# Patient Record
Sex: MALE | Race: WHITE
[De-identification: names, ages, dates, MRNs, and addresses within clinical notes are randomized per-mention and may not be internally consistent; named-entity substitution may affect disease eponyms.]

---

## 2019-10-07 ENCOUNTER — HOSPITAL ENCOUNTER (EMERGENCY)
Dept: HOSPITAL 41 - JD.ED | Age: 79
Discharge: HOME | End: 2019-10-07
Payer: MEDICARE

## 2019-10-07 DIAGNOSIS — Z79.82: ICD-10-CM

## 2019-10-07 DIAGNOSIS — Z79.899: ICD-10-CM

## 2019-10-07 DIAGNOSIS — I48.91: ICD-10-CM

## 2019-10-07 DIAGNOSIS — E78.00: ICD-10-CM

## 2019-10-07 DIAGNOSIS — J40: Primary | ICD-10-CM

## 2019-10-07 DIAGNOSIS — Z88.8: ICD-10-CM

## 2019-10-07 DIAGNOSIS — Z91.048: ICD-10-CM

## 2019-10-07 DIAGNOSIS — Z79.01: ICD-10-CM

## 2019-10-07 PROCEDURE — 86140 C-REACTIVE PROTEIN: CPT

## 2019-10-07 PROCEDURE — 71045 X-RAY EXAM CHEST 1 VIEW: CPT

## 2019-10-07 PROCEDURE — 85025 COMPLETE CBC W/AUTO DIFF WBC: CPT

## 2019-10-07 PROCEDURE — 99283 EMERGENCY DEPT VISIT LOW MDM: CPT

## 2019-10-07 PROCEDURE — 36415 COLL VENOUS BLD VENIPUNCTURE: CPT

## 2019-10-07 NOTE — EDM.PDOC
ED HPI GENERAL MEDICAL PROBLEM





- General


Chief Complaint: Respiratory Problem


Stated Complaint: POSSIBLE PNEUMONIA


Time Seen by Provider: 10/07/19 16:07


Source of Information: Reports: Patient, RN Notes Reviewed





- History of Present Illness


INITIAL COMMENTS - FREE TEXT/NARRATIVE: 





78 year old male comes in with continued cough that first started about 10 days 

ago.  He was prescribed a z pac about 10 days ago.  The cough continues about 

the same, occasionally productive of mildly colored phlegm.  He has had some 

chills.  No definite fever.  Mildly more short of breath than usual. He did 

have a sore throat initially but that is gone.  Mild chronic leg swelling. 





- Related Data


 Allergies











Allergy/AdvReac Type Severity Reaction Status Date / Time


 


adhesive Allergy  Rash Verified 10/07/19 16:11


 


Penicillins Allergy  Airway Verified 10/07/19 16:11





   Tightness  


 


povidone-iodine Allergy  Rash Verified 10/07/19 16:11





[From Betadine]     


 


soap [From Betadine] Allergy  Rash Verified 10/07/19 16:11











Home Meds: 


 Home Meds





Acetaminophen [Tylenol Arthritis Pain] 1 tab PO TID PRN 09/24/14 [History]


Aspirin [Low Dose Aspirin EC] 81 mg PO DAILY 09/24/14 [History]


Atenolol 1 tab PO DAILY 09/24/14 [History]


Fluticasone Propionate [Flonase] 1 spray NASBOTH DAILY PRN 09/24/14 [History]


Furosemide [Lasix] 1 tab PO BID 09/24/14 [History]


Gabapentin [Neurontin] 1 tab PO TID 09/24/14 [History]


Isosorbide Mononitrate [Isosorbide Mononitrate ER] 60 mg PO DAILY 09/24/14 [

History]


Lisinopril 10 mg PO DAILY 09/24/14 [History]


Multivitamin [Multivitamins] 1 cap PO DAILY 09/24/14 [History]


Nabumetone [Relafen] 500 mg PO BID 09/24/14 [History]


Nitroglycerin [Nitrostat] 0.4 mg SL ASDIRECTED PRN 09/24/14 [History]


Omeprazole [Prilosec] 20 mg PO DAILY 09/24/14 [History]


Simvastatin [Zocor] 20 mg PO BEDTIME 09/24/14 [History]


Tamsulosin HCl [Flomax] 1 cap PO DAILY 09/24/14 [History]


Warfarin [Coumadin] 2 mg PO DAILY 09/24/14 [History]


guaiFEN/Phenyleph/Acetaminophn [Mucinex Fast-Max Cold-Sinus Tb] 400 mg PO DAILY 

09/24/14 [History]


Doxycycline [Vibramycin] 100 mg PO BID #20 tab 10/07/19 [Rx]











Past Medical History


Cardiovascular History: Reports: Afib, High Cholesterol, Hypertension


Respiratory History: Reports: Bronchitis, Recurrent





- Past Surgical History


Cardiovascular Surgical History: Reports: Coronary Artery Stent


Neurological Surgical History: Reports: Lumbar Spine


Musculoskeletal Surgical History: Reports: Knee Replacement





Social & Family History





- Tobacco Use


Smoking Status *Q: Never Smoker





- Caffeine Use


Caffeine Use: Reports: None





- Recreational Drug Use


Recreational Drug Use: No





ED ROS GENERAL





- Review of Systems


Review Of Systems: See Below


Constitutional: Reports: Chills.  Denies: Fever (no definite fever)


HEENT: Reports: Rhinitis (chronic), Throat Pain (gone)


Respiratory: Reports: Shortness of Breath, Cough, Sputum


Cardiovascular: Reports: Dyspnea on Exertion.  Denies: Chest Pain


Endocrine: Reports: Fatigue


GI/Abdominal: Denies: Abdominal Pain, Nausea, Vomiting


Musculoskeletal: Denies: Shoulder Pain, Arm Pain


Skin: Reports: No Symptoms


Neurological: Reports: No Symptoms





ED EXAM, GENERAL





- Physical Exam


Exam: See Below


General Appearance: Alert, No Apparent Distress


Eye Exam: Bilateral Eye: PERRL


Throat/Mouth: Normal Inspection, Normal Oropharynx


Head: Atraumatic


Neck: Supple


Respiratory/Chest: No Respiratory Distress, Lungs Clear, Normal Breath Sounds.  

No: Rales, Rhonchi, Wheezing


Cardiovascular: Regular Rate, Rhythm


GI/Abdominal: Non-Tender


Extremities: Pedal Edema (very mild bilat leg swelling).  No: Leg Pain, 

Increased Warmth, Redness





Course





- Vital Signs


Last Recorded V/S: 


 Last Vital Signs











Temp  97.9 F   10/07/19 16:09


 


Pulse  87   10/07/19 16:09


 


Resp  18   10/07/19 16:09


 


BP  141/74 H  10/07/19 16:09


 


Pulse Ox  94 L  10/07/19 16:09














- Orders/Labs/Meds


Orders: 


 Active Orders 24 hr











 Category Date Time Status


 


 Influenza Vaccine Charge [RC] .DISCHARGE Care  10/07/19 16:17 Active


 


 Chest 1V Frontal [CR] Stat Exams  10/07/19 16:30 Taken











Labs: 


 Laboratory Tests











  10/07/19 10/07/19 Range/Units





  16:44 16:44 


 


WBC   7.59  (4.23-9.07)  K/mm3


 


RBC   4.34 L  (4.63-6.08)  M/mm3


 


Hgb   13.8  (13.7-17.5)  gm/dl


 


Hct   39.6 L  (40.1-51.0)  %


 


MCV   91.2  (79.0-92.2)  fl


 


MCH   31.8  (25.7-32.2)  pg


 


MCHC   34.8  (32.2-35.5)  g/dl


 


RDW Std Deviation   44.9 H  (35.1-43.9)  fL


 


Plt Count   183  (163-337)  K/mm3


 


MPV   11.4  (9.4-12.3)  fl


 


Neut % (Auto)   61.9  (34.0-67.9)  %


 


Lymph % (Auto)   25.8  (21.8-53.1)  %


 


Mono % (Auto)   9.4  (5.3-12.2)  %


 


Eos % (Auto)   2.6  (0.8-7.0)  


 


Baso % (Auto)   0.3  (0.1-1.2)  %


 


Neut # (Auto)   4.70  (1.78-5.38)  K/mm3


 


Lymph # (Auto)   1.96  (1.32-3.57)  K/mm3


 


Mono # (Auto)   0.71  (0.30-0.82)  K/mm3


 


Eos # (Auto)   0.20  (0.04-0.54)  K/mm3


 


Baso # (Auto)   0.02  (0.01-0.08)  K/mm3


 


Manual Slide Review     


 


C-Reactive Protein  < 0.2   (<1.0)  mg/dL











Meds: 


Medications














Discontinued Medications














Generic Name Dose Route Start Last Admin





  Trade Name Freq  PRN Reason Stop Dose Admin


 


Doxycycline Hyclate  100 mg  10/07/19 17:51  





  Vibramycin  PO  10/07/19 17:52  





  ONETIME ONE   





     





     





     





     


 


Influenza Virus Vaccine  1 each  10/07/19 16:17  





  Pharmacy To Dose - Influenza Vaccine  [facility]  10/07/19 16:18  





  ONETIME ONE   





     





     





     





     


 


Influenza Virus Vaccine  180 mcg  10/07/19 16:30  





  Fluzone High-Dose 2019-20 Syringe  [facility]  10/07/19 16:31  





  .ONCE ONE   





     





     





     





     














Departure





- Departure


Time of Disposition: 18:01


Disposition: Home, Self-Care 01


Condition: Fair


Clinical Impression: 


 Bronchitis








- Discharge Information


Forms:  ED Department Discharge


Additional Instructions: 


doxycycline 100 mg twice daily for 10 days. Your first dose has been given here 

in the ED.  Prescription has been sent electronic to [facility].   

Vaporizer or steam as needed.  Continue other medications as prescribed.  

Follow up with your regular medical provider or Pulmonologist in about 10 days 

for recheck.  Return to ED as needed if symptoms worsening in any way.  





- My Orders


Last 24 Hours: 


My Active Orders





10/07/19 16:17


Influenza Vaccine Charge [RC] .DISCHARGE 





10/07/19 16:30


Chest 1V Frontal [CR] Stat 














- Assessment/Plan


Last 24 Hours: 


My Active Orders





10/07/19 16:17


Influenza Vaccine Charge [RC] .DISCHARGE 





10/07/19 16:30


Chest 1V Frontal [CR] Stat

## 2019-10-07 NOTE — CR
Chest: Portable view of the chest was obtained.

 

Comparison: Prior chest x-ray of 12/06/10.

 

Slight increased density along the left cardiac margin is noted most 

likely due to an area of atelectasis.  No acute parenchymal change is 

otherwise seen.  Heart is felt to be mildly enlarged.  Upper 

mediastinum is normal.  Bony structures are grossly intact.

 

Impression:

1.  Findings as noted above.  Nothing acute is definitely appreciated.

 

Diagnostic code #2

## 2020-01-14 ENCOUNTER — HOSPITAL ENCOUNTER (EMERGENCY)
Dept: HOSPITAL 41 - JD.ED | Age: 80
Discharge: HOME | End: 2020-01-14
Payer: MEDICARE

## 2020-01-14 DIAGNOSIS — Z87.891: ICD-10-CM

## 2020-01-14 DIAGNOSIS — Z88.8: ICD-10-CM

## 2020-01-14 DIAGNOSIS — Z88.0: ICD-10-CM

## 2020-01-14 DIAGNOSIS — K21.9: ICD-10-CM

## 2020-01-14 DIAGNOSIS — E78.00: ICD-10-CM

## 2020-01-14 DIAGNOSIS — Z79.82: ICD-10-CM

## 2020-01-14 DIAGNOSIS — I48.91: ICD-10-CM

## 2020-01-14 DIAGNOSIS — R79.1: Primary | ICD-10-CM

## 2020-01-14 DIAGNOSIS — Z79.899: ICD-10-CM

## 2020-01-14 DIAGNOSIS — I25.2: ICD-10-CM

## 2020-01-14 DIAGNOSIS — Z91.048: ICD-10-CM

## 2020-01-14 DIAGNOSIS — M19.90: ICD-10-CM

## 2020-01-14 DIAGNOSIS — I10: ICD-10-CM

## 2020-01-14 DIAGNOSIS — N40.0: ICD-10-CM

## 2020-01-14 PROCEDURE — 85610 PROTHROMBIN TIME: CPT

## 2020-01-14 PROCEDURE — 80053 COMPREHEN METABOLIC PANEL: CPT

## 2020-01-14 PROCEDURE — 99284 EMERGENCY DEPT VISIT MOD MDM: CPT

## 2020-01-14 PROCEDURE — 85025 COMPLETE CBC W/AUTO DIFF WBC: CPT

## 2020-01-14 PROCEDURE — 36415 COLL VENOUS BLD VENIPUNCTURE: CPT

## 2020-01-14 NOTE — EDM.PDOC
ED HPI GENERAL MEDICAL PROBLEM





- General


Chief Complaint: General


Stated Complaint: ISSUE WITH HIS BLOOD SENT BY [facility]


Time Seen by Provider: 01/14/20 18:09


Source of Information: Reports: Patient


History Limitations: Reports: No Limitations





- History of Present Illness


INITIAL COMMENTS - FREE TEXT/NARRATIVE: 





The patient presents with supratherapeutic INR.  He is on coumadin for heart 

disease according to him and his INR was 12.9 today.  The recommended to hold 

the coumadin today and tomorrow and get a dose of vitamin K.  They did not have 

any in [place] where he is from.  He has no headache, fever, chills, cough, 

chest pain, shortness of breath, abdominal pain, nausea, vomiting or any 

bleeding.  He did not take extra doses of his medications and did not eat 

anything he is not supposed to.


Onset: Gradual


Duration: Day(s):


Severity: Moderate


Improves with: Reports: None


Worsens with: Reports: None


Associated Symptoms: Reports: No Other Symptoms





- Related Data


 Allergies











Allergy/AdvReac Type Severity Reaction Status Date / Time


 


adhesive Allergy  Rash Verified 01/14/20 17:51


 


Penicillins Allergy  Airway Verified 01/14/20 17:51





   Tightness  


 


povidone-iodine Allergy  Rash Verified 01/14/20 17:51





[From Betadine]     


 


soap [From Betadine] Allergy  Rash Verified 01/14/20 17:51











Home Meds: 


 Home Meds





Aspirin [Low Dose Aspirin EC] 81 mg PO DAILY 09/24/14 [History]


Fluticasone Propionate [Flonase] 1 spray NASBOTH DAILY PRN 09/24/14 [History]


Gabapentin [Neurontin] 600 mg PO DAILY 09/24/14 [History]


Multivitamin [Multivitamins] 1 cap PO DAILY 09/24/14 [History]


Nabumetone [Relafen] 500 mg PO BID 09/24/14 [History]


Omeprazole [Prilosec] 20 mg PO DAILY 09/24/14 [History]


Tamsulosin HCl [Flomax] 1 cap PO DAILY 09/24/14 [History]


atenoloL [Atenolol] 1 tab PO DAILY 09/24/14 [History]


Acetaminophen [Tylenol Extra Strength] 500 mg PO BID 01/14/20 [History]


Albuterol Inhaler/Ventolin  01/14/20 [History]


Baclofen 10 mg PO BID 01/14/20 [History]


Beta-Carotene(A)-Vits C,E/Mins [Vision Vitamins] 1 tab PO BID 01/14/20 [History]


Cholecalciferol (Vitamin D3) [Vitamin D3] 2,000 unit PO DAILY 01/14/20 [History]


Furosemide [Lasix] 40 mg PO DAILY 01/14/20 [History]


Gabapentin [Neurontin] 900 mg PO BEDTIME 01/14/20 [History]


Isosorbide Mononitrate [Imdur] 30 mg PO BEDTIME 01/14/20 [History]


Isosorbide Mononitrate [Imdur] 60 mg PO DAILY 01/14/20 [History]


Mucinex.  01/14/20 [History]


Nitroglycerin 0.4 mg SL Q5M PRN 01/14/20 [History]


Simvastatin 40 mg PO BEDTIME 01/14/20 [History]


Warfarin [Coumadin] 2.5 mg PO MOWEFR 01/14/20 [History]


Warfarin [Coumadin] 5 mg PO SUTUTHSA 01/14/20 [History]


traMADol [Ultram] 50 mg PO Q4HR PRN 01/14/20 [History]











Past Medical History


HEENT History: Reports: Macular Degeneration


Cardiovascular History: Reports: Afib, High Cholesterol, Hypertension, MI


Respiratory History: Reports: Bronchitis, Recurrent


Gastrointestinal History: Reports: GERD


Genitourinary History: Reports: BPH


Musculoskeletal History: Reports: Arthritis





- Past Surgical History


Cardiovascular Surgical History: Reports: Coronary Artery Stent


Neurological Surgical History: Reports: Lumbar Spine


Musculoskeletal Surgical History: Reports: Knee Replacement





Social & Family History





- Family History


Family Medical History: Noncontributory





- Tobacco Use


Smoking Status *Q: Former Smoker


Used Tobacco, but Quit: Yes


Month/Year Tobacco Last Used: 01/2002





- Caffeine Use


Caffeine Use: Reports: Coffee





- Recreational Drug Use


Recreational Drug Use: No





ED ROS GENERAL





- Review of Systems


Review Of Systems: See Below


Constitutional: Reports: No Symptoms


HEENT: Reports: No Symptoms


Respiratory: Reports: No Symptoms


Cardiovascular: Reports: No Symptoms


Endocrine: Reports: No Symptoms


GI/Abdominal: Reports: No Symptoms


: Reports: No Symptoms


Musculoskeletal: Reports: No Symptoms





ED EXAM, GENERAL





- Physical Exam


Exam: See Below


Exam Limited By: No Limitations


General Appearance: Alert, No Apparent Distress


Ears: Normal External Exam


Nose: Normal Inspection


Head: Atraumatic, Normocephalic


Neck: Normal Inspection


Respiratory/Chest: No Respiratory Distress, Lungs Clear, Normal Breath Sounds


Cardiovascular: Regular Rate, Rhythm, No Edema, No Murmur


GI/Abdominal: Soft, Non-Tender, No Organomegaly, No Mass


Extremities: Normal Inspection


Neurological: Alert, Oriented, No Motor/Sensory Deficits





Course





- Vital Signs


Last Recorded V/S: 


 Last Vital Signs











Temp  97.1 F   01/14/20 17:47


 


Pulse  55 L  01/14/20 17:47


 


Resp  19   01/14/20 17:47


 


BP  160/71 H  01/14/20 17:47


 


Pulse Ox  96   01/14/20 17:47














- Orders/Labs/Meds


Orders: 


 Active Orders 24 hr











 Category Date Time Status


 


 Cardiac Monitoring [RC] .AS DIRECTED Care  01/14/20 18:15 Active


 


 Peripheral IV Care [RC] .AS DIRECTED Care  01/14/20 18:16 Active


 


 Sodium Chloride 0.9% [Saline Flush] Med  01/14/20 18:15 Active





 10 ml FLUSH ASDIRECTED PRN   


 


 Peripheral IV Insertion Adult [OM.PC] Stat Oth  01/14/20 18:15 Ordered








 Medication Orders





Sodium Chloride (Saline Flush)  10 ml FLUSH ASDIRECTED PRN


   PRN Reason: Keep Vein Open


   Last Admin: 01/14/20 18:50  Dose: 10 ml








Labs: 


 Laboratory Tests











  01/14/20 01/14/20 01/14/20 Range/Units





  18:50 18:50 18:50 


 


WBC  6.61    (4.23-9.07)  K/mm3


 


RBC  4.32 L    (4.63-6.08)  M/mm3


 


Hgb  13.1 L    (13.7-17.5)  gm/dl


 


Hct  39.8 L    (40.1-51.0)  %


 


MCV  92.1    (79.0-92.2)  fl


 


MCH  30.3    (25.7-32.2)  pg


 


MCHC  32.9    (32.2-35.5)  g/dl


 


RDW Std Deviation  45.6 H    (35.1-43.9)  fL


 


Plt Count  145 L    (163-337)  K/mm3


 


MPV  11.0    (9.4-12.3)  fl


 


Neut % (Auto)  51.0    (34.0-67.9)  %


 


Lymph % (Auto)  34.9    (21.8-53.1)  %


 


Mono % (Auto)  10.9    (5.3-12.2)  %


 


Eos % (Auto)  2.4    (0.8-7.0)  


 


Baso % (Auto)  0.3    (0.1-1.2)  %


 


Neut # (Auto)  3.37    (1.78-5.38)  K/mm3


 


Lymph # (Auto)  2.31    (1.32-3.57)  K/mm3


 


Mono # (Auto)  0.72    (0.30-0.82)  K/mm3


 


Eos # (Auto)  0.16    (0.04-0.54)  K/mm3


 


Baso # (Auto)  0.02    (0.01-0.08)  K/mm3


 


PT   > 90.0 H*   (9.7-12.0)  SECONDS


 


INR   TNP   


 


Sodium    146 H  (136-145)  mEq/L


 


Potassium    4.0  (3.5-5.1)  mEq/L


 


Chloride    108 H  ()  mEq/L


 


Carbon Dioxide    27  (21-32)  mEq/L


 


Anion Gap    15.0  (5-15)  


 


BUN    26 H  (7-18)  mg/dL


 


Creatinine    1.4 H  (0.7-1.3)  mg/dL


 


Est Cr Clr Drug Dosing    40.04  mL/min


 


Estimated GFR (MDRD)    49  (>60)  mL/min


 


BUN/Creatinine Ratio    18.6 H  (14-18)  


 


Glucose    104  ()  mg/dL


 


Calcium    8.3 L  (8.5-10.1)  mg/dL


 


Total Bilirubin    0.4  (0.2-1.0)  mg/dL


 


AST    14 L  (15-37)  U/L


 


ALT    21  (16-63)  U/L


 


Alkaline Phosphatase    73  ()  U/L


 


Total Protein    7.0  (6.4-8.2)  g/dl


 


Albumin    3.6  (3.4-5.0)  g/dl


 


Globulin    3.4  gm/dL


 


Albumin/Globulin Ratio    1.1  (1-2)  











Meds: 


Medications











Generic Name Dose Route Start Last Admin





  Trade Name Freq  PRN Reason Stop Dose Admin


 


Sodium Chloride  10 ml  01/14/20 18:15  01/14/20 18:50





  Saline Flush  FLUSH   10 ml





  ASDIRECTED PRN   Administration





  Keep Vein Open   





     





     





     














Discontinued Medications














Generic Name Dose Route Start Last Admin





  Trade Name Lala  PRN Reason Stop Dose Admin


 


Phytonadione  5 mg  01/14/20 18:20  01/14/20 19:11





  Aquamephyton  PO  01/14/20 18:21  5 mg





  ONETIME ONE   Administration





     





     





     





     














- Re-Assessments/Exams


Free Text/Narrative Re-Assessment/Exam: 





01/14/20 19:06


I ordered an IV saline lock, labs and an INR.  I also ordered vitamin K 5mg by 

mouth.


01/14/20 19:43


His CBC looks good.  His PT is >90 and they cannot calculate his INR ours goes 

up to 8.  His Na is slightly elevated at 146.  His creatinine is slightly 

elevated at 1.4.





Departure





- Departure


Time of Disposition: 19:45


Disposition: Home, Self-Care 01


Condition: Good


Clinical Impression: 


 Supratherapeutic INR








- Discharge Information


*PRESCRIPTION DRUG MONITORING PROGRAM REVIEWED*: Not Applicable


*COPY OF PRESCRIPTION DRUG MONITORING REPORT IN PATIENT ROSIE: Not Applicable


Referrals: 


Tony Cadet MD [Primary Care Provider] - 


Forms:  ED Department Discharge


Additional Instructions: 


Hold your coumadin tonight and tomorrow.  Have your INR checked tomorrow or the 

next day.  Be careful not to fall.  Please return if you are worse.





Sepsis Event Note





- Evaluation


Sepsis Screening Result: No Definite Risk





- Focused Exam


Vital Signs: 


 Vital Signs











  Temp Pulse Resp BP Pulse Ox


 


 01/14/20 17:47  97.1 F  55 L  19  160/71 H  96











Date Exam was Performed: 01/14/20


Time Exam was Performed: 19:43





- My Orders


Last 24 Hours: 


My Active Orders





01/14/20 18:15


Cardiac Monitoring [RC] .AS DIRECTED 


Sodium Chloride 0.9% [Saline Flush]   10 ml FLUSH ASDIRECTED PRN 


Peripheral IV Insertion Adult [OM.PC] Stat 





01/14/20 18:16


Peripheral IV Care [RC] .AS DIRECTED 














- Assessment/Plan


Last 24 Hours: 


My Active Orders





01/14/20 18:15


Cardiac Monitoring [RC] .AS DIRECTED 


Sodium Chloride 0.9% [Saline Flush]   10 ml FLUSH ASDIRECTED PRN 


Peripheral IV Insertion Adult [OM.PC] Stat 





01/14/20 18:16


Peripheral IV Care [RC] .AS DIRECTED